# Patient Record
Sex: FEMALE | NOT HISPANIC OR LATINO | URBAN - METROPOLITAN AREA
[De-identification: names, ages, dates, MRNs, and addresses within clinical notes are randomized per-mention and may not be internally consistent; named-entity substitution may affect disease eponyms.]

---

## 2024-07-23 ENCOUNTER — TELEPHONE (OUTPATIENT)
Dept: INTERNAL MEDICINE | Age: 29
End: 2024-07-23

## 2024-07-24 ENCOUNTER — TELEPHONE (OUTPATIENT)
Dept: INTERNAL MEDICINE | Age: 29
End: 2024-07-24

## 2024-08-25 ENCOUNTER — APPOINTMENT (OUTPATIENT)
Dept: ULTRASOUND IMAGING | Age: 29
End: 2024-08-25
Attending: EMERGENCY MEDICINE
Payer: COMMERCIAL

## 2024-08-25 ENCOUNTER — HOSPITAL ENCOUNTER (EMERGENCY)
Age: 29
Discharge: HOME | End: 2024-08-25
Attending: EMERGENCY MEDICINE
Payer: COMMERCIAL

## 2024-08-25 VITALS
WEIGHT: 165 LBS | RESPIRATION RATE: 18 BRPM | DIASTOLIC BLOOD PRESSURE: 60 MMHG | BODY MASS INDEX: 28.17 KG/M2 | SYSTOLIC BLOOD PRESSURE: 95 MMHG | OXYGEN SATURATION: 100 % | HEART RATE: 72 BPM | HEIGHT: 64 IN | TEMPERATURE: 98 F

## 2024-08-25 DIAGNOSIS — R10.13 EPIGASTRIC PAIN: ICD-10-CM

## 2024-08-25 DIAGNOSIS — K80.20 CALCULUS OF GALLBLADDER WITHOUT CHOLECYSTITIS WITHOUT OBSTRUCTION: Primary | ICD-10-CM

## 2024-08-25 LAB
ALBUMIN SERPL-MCNC: 3.8 G/DL (ref 3.4–5)
ALBUMIN/GLOB SERPL: 1 {RATIO} (ref 1–2)
ALP LIVER SERPL-CCNC: 93 U/L
ALT SERPL-CCNC: 68 U/L
ANION GAP SERPL CALC-SCNC: 7 MMOL/L (ref 0–18)
AST SERPL-CCNC: 112 U/L (ref 15–37)
B-HCG UR QL: NEGATIVE
BASOPHILS # BLD AUTO: 0.02 X10(3) UL (ref 0–0.2)
BASOPHILS NFR BLD AUTO: 0.2 %
BILIRUB SERPL-MCNC: 0.4 MG/DL (ref 0.1–2)
BILIRUB UR QL STRIP.AUTO: NEGATIVE
BUN BLD-MCNC: 10 MG/DL (ref 9–23)
CALCIUM BLD-MCNC: 8.9 MG/DL (ref 8.5–10.1)
CHLORIDE SERPL-SCNC: 105 MMOL/L (ref 98–112)
CLARITY UR REFRACT.AUTO: CLEAR
CO2 SERPL-SCNC: 26 MMOL/L (ref 21–32)
COLOR UR AUTO: YELLOW
CREAT BLD-MCNC: 0.83 MG/DL
EGFRCR SERPLBLD CKD-EPI 2021: 99 ML/MIN/1.73M2 (ref 60–?)
EOSINOPHIL # BLD AUTO: 0.13 X10(3) UL (ref 0–0.7)
EOSINOPHIL NFR BLD AUTO: 1.2 %
ERYTHROCYTE [DISTWIDTH] IN BLOOD BY AUTOMATED COUNT: 13 %
GLOBULIN PLAS-MCNC: 3.7 G/DL (ref 2.8–4.4)
GLUCOSE BLD-MCNC: 100 MG/DL (ref 70–99)
GLUCOSE UR STRIP.AUTO-MCNC: NEGATIVE MG/DL
HCT VFR BLD AUTO: 41.2 %
HGB BLD-MCNC: 13.8 G/DL
IMM GRANULOCYTES # BLD AUTO: 0.04 X10(3) UL (ref 0–1)
IMM GRANULOCYTES NFR BLD: 0.4 %
KETONES UR STRIP.AUTO-MCNC: NEGATIVE MG/DL
LEUKOCYTE ESTERASE UR QL STRIP.AUTO: NEGATIVE
LIPASE SERPL-CCNC: 45 U/L (ref 12–53)
LYMPHOCYTES # BLD AUTO: 2.98 X10(3) UL (ref 1–4)
LYMPHOCYTES NFR BLD AUTO: 27.7 %
MCH RBC QN AUTO: 28.4 PG (ref 26–34)
MCHC RBC AUTO-ENTMCNC: 33.5 G/DL (ref 31–37)
MCV RBC AUTO: 84.8 FL
MONOCYTES # BLD AUTO: 0.61 X10(3) UL (ref 0.1–1)
MONOCYTES NFR BLD AUTO: 5.7 %
NEUTROPHILS # BLD AUTO: 6.99 X10 (3) UL (ref 1.5–7.7)
NEUTROPHILS # BLD AUTO: 6.99 X10(3) UL (ref 1.5–7.7)
NEUTROPHILS NFR BLD AUTO: 64.8 %
NITRITE UR QL STRIP.AUTO: NEGATIVE
OSMOLALITY SERPL CALC.SUM OF ELEC: 285 MOSM/KG (ref 275–295)
PH UR STRIP.AUTO: 6 [PH] (ref 5–8)
PLATELET # BLD AUTO: 355 10(3)UL (ref 150–450)
POTASSIUM SERPL-SCNC: 3.5 MMOL/L (ref 3.5–5.1)
PROT SERPL-MCNC: 7.5 G/DL (ref 6.4–8.2)
PROT UR STRIP.AUTO-MCNC: NEGATIVE MG/DL
RBC # BLD AUTO: 4.86 X10(6)UL
SODIUM SERPL-SCNC: 138 MMOL/L (ref 136–145)
SP GR UR STRIP.AUTO: 1.01 (ref 1–1.03)
UROBILINOGEN UR STRIP.AUTO-MCNC: 0.2 MG/DL
WBC # BLD AUTO: 10.8 X10(3) UL (ref 4–11)

## 2024-08-25 PROCEDURE — 81025 URINE PREGNANCY TEST: CPT

## 2024-08-25 PROCEDURE — 99284 EMERGENCY DEPT VISIT MOD MDM: CPT

## 2024-08-25 PROCEDURE — 96375 TX/PRO/DX INJ NEW DRUG ADDON: CPT

## 2024-08-25 PROCEDURE — 81015 MICROSCOPIC EXAM OF URINE: CPT | Performed by: EMERGENCY MEDICINE

## 2024-08-25 PROCEDURE — 80053 COMPREHEN METABOLIC PANEL: CPT | Performed by: EMERGENCY MEDICINE

## 2024-08-25 PROCEDURE — 81001 URINALYSIS AUTO W/SCOPE: CPT | Performed by: EMERGENCY MEDICINE

## 2024-08-25 PROCEDURE — 85025 COMPLETE CBC W/AUTO DIFF WBC: CPT | Performed by: EMERGENCY MEDICINE

## 2024-08-25 PROCEDURE — 76700 US EXAM ABDOM COMPLETE: CPT | Performed by: EMERGENCY MEDICINE

## 2024-08-25 PROCEDURE — 96361 HYDRATE IV INFUSION ADD-ON: CPT

## 2024-08-25 PROCEDURE — 96374 THER/PROPH/DIAG INJ IV PUSH: CPT

## 2024-08-25 PROCEDURE — 99285 EMERGENCY DEPT VISIT HI MDM: CPT

## 2024-08-25 PROCEDURE — 83690 ASSAY OF LIPASE: CPT | Performed by: EMERGENCY MEDICINE

## 2024-08-25 RX ORDER — SODIUM CHLORIDE 9 MG/ML
INJECTION, SOLUTION INTRAVENOUS CONTINUOUS
Status: DISCONTINUED | OUTPATIENT
Start: 2024-08-25 | End: 2024-08-25

## 2024-08-25 RX ORDER — KETOROLAC TROMETHAMINE 15 MG/ML
15 INJECTION, SOLUTION INTRAMUSCULAR; INTRAVENOUS ONCE
Status: COMPLETED | OUTPATIENT
Start: 2024-08-25 | End: 2024-08-25

## 2024-08-25 RX ORDER — ONDANSETRON 4 MG/1
4 TABLET, ORALLY DISINTEGRATING ORAL EVERY 4 HOURS PRN
Qty: 10 TABLET | Refills: 0 | Status: SHIPPED | OUTPATIENT
Start: 2024-08-25 | End: 2024-09-01

## 2024-08-25 RX ORDER — HYDROCODONE BITARTRATE AND ACETAMINOPHEN 10; 325 MG/1; MG/1
TABLET ORAL
Qty: 10 TABLET | Refills: 0 | Status: SHIPPED | OUTPATIENT
Start: 2024-08-25

## 2024-08-25 RX ORDER — HYDROCODONE BITARTRATE AND ACETAMINOPHEN 5; 325 MG/1; MG/1
1-2 TABLET ORAL EVERY 6 HOURS PRN
Qty: 10 TABLET | Refills: 0 | Status: SHIPPED | OUTPATIENT
Start: 2024-08-25 | End: 2024-08-25 | Stop reason: RX

## 2024-08-25 RX ORDER — ONDANSETRON 2 MG/ML
4 INJECTION INTRAMUSCULAR; INTRAVENOUS ONCE
Status: COMPLETED | OUTPATIENT
Start: 2024-08-25 | End: 2024-08-25

## 2024-08-25 RX ORDER — MAGNESIUM HYDROXIDE/ALUMINUM HYDROXICE/SIMETHICONE 120; 1200; 1200 MG/30ML; MG/30ML; MG/30ML
30 SUSPENSION ORAL ONCE
Status: COMPLETED | OUTPATIENT
Start: 2024-08-25 | End: 2024-08-25

## 2024-08-25 NOTE — ED PROVIDER NOTES
Patient Seen in: [facility] In [place]      History     Chief Complaint   Patient presents with    Abdominal Pain     Stated Complaint: abd pain xmonths    Subjective:   HPI    Patient is a 28-year-old female who states for the past months she has had several episodes of epigastric pain.  Patient states pain usually comes and goes fairly quickly.  Patient states this morning it woke her up around 9:30 AM.  Patient states pain 8 out of 10 epigastric.  Patient states it is dull ache sometimes spasming.  Patient denies nausea vomiting, no fevers or chills, no diarrhea, no hematuria dysuria.  Patient presently on her menstrual period.  Patient did have D&C 2 months ago the gynecology felt it was unrelated.  Remainder of review of systems negative.    Objective:   History reviewed. No pertinent past medical history.           History reviewed. No pertinent surgical history.             No pertinent social history.        Non-smoker, no alcohol, no drugs    Review of Systems    Positive for stated Chief Complaint: Abdominal Pain    Other systems are as noted in HPI.  Constitutional and vital signs reviewed.      All other systems reviewed and negative except as noted above.    Physical Exam     ED Triage Vitals   BP 08/25/24 1012 113/67   Pulse 08/25/24 1012 76   Resp 08/25/24 1012 14   Temp 08/25/24 1012 97.6 °F (36.4 °C)   Temp src 08/25/24 1012 Temporal   SpO2 08/25/24 1012 100 %   O2 Device 08/25/24 1151 None (Room air)       Current Vitals:   Vital Signs  BP: 95/60  Pulse: 72  Resp: 18  Temp: 97.6 °F (36.4 °C)  Temp src: Temporal    Oxygen Therapy  SpO2: 100 %  O2 Device: None (Room air)            Physical Exam  GENERAL: Patient resting on the cart in no acute distress.  HEENT: Extraocular muscles intact, pupils equal round reactive to light.  Mouth normal, neck supple, no meningismus.  LUNGS: Lungs clear to auscultation bilaterally.  CARDIOVASCULAR: + S1-S2, regular rate and rhythm, no  murmurs.  BACK: No CVA tenderness, no midline bony tenderness.  ABDOMEN: + Bowel sounds, soft, mild tenderness epigastric right upper quadrant nondistended.  No rebound, no guarding, no hepatosplenomegaly.  EXTREMITIES: Full range of motion, no tenderness, good capillary refill.  SKIN: No rash, good turgor.  NEURO: Patient answers questions appropriately.  No focal deficits appreciated.           ED Course     Labs Reviewed   COMP METABOLIC PANEL (14) - Abnormal; Notable for the following components:       Result Value    Glucose 100 (*)      (*)     ALT 68 (*)     All other components within normal limits   URINALYSIS WITH CULTURE REFLEX - Abnormal; Notable for the following components:    Blood Urine Trace-lysed (*)     All other components within normal limits   UA MICROSCOPIC ONLY, URINE - Abnormal; Notable for the following components:    RBC Urine 3-5 (*)     Bacteria Urine 1+ (*)     Squamous Epi. Cells Moderate (*)     Renal Tubular Epithelial Cells Few (*)     All other components within normal limits   LIPASE - Normal   POCT PREGNANCY URINE - Normal   CBC WITH DIFFERENTIAL WITH PLATELET             Abdominal ultrasoundThere is cholelithiasis.  There is no sonographic evidence of cholecystitis.  Common bile duct is upper normal measuring 6 mm.   Independent reviewed by myself, cholelithiasis         MDM      Patient given IV fluids.  Patient given Toradol.  Patient did feel improved.  Patient still had some discomfort was given Maalox however pain seemed to improve prior to receiving it.  Patient ultrasound did show cholelithiasis.  Patient able to tolerate p.o. liquids.  Patient felt comfortable going home.  Patient had normal white count no fever but slightly elevated liver enzymes.  Patient be referred to surgery.  Recommend return if fever, vomiting, uncontrolled pain, new or worse symptoms.  Clear liquids today, advance diet as tolerated.  Ibuprofen first Norco if needed.  Zofran as needed for  nausea.  Low-fat diet.  Also Prilosec over-the-counter as discussed.  Patient also given primary physician.  I did consider cholelithiasis, cholecystitis, gastritis, ulcer.                                   Medical Decision Making      Disposition and Plan     Clinical Impression:  1. Calculus of gallbladder without cholecystitis without obstruction    2. Epigastric pain         Disposition:  Discharge  8/25/2024 12:31 pm    Follow-up:  Kali Guevara MD  [address]  [address]  [facility] [id number] [phone]    Follow up in 2 day(s)      Marleen Odonnell MD  [address]  [facility] [address]  [phone]    Call in 2 day(s)            Medications Prescribed:  Discharge Medication List as of 8/25/2024 12:34 PM        START taking these medications    Details   ondansetron 4 MG Oral Tablet Dispersible Take 1 tablet (4 mg total) by mouth every 4 (four) hours as needed for Nausea., Normal, Disp-10 tablet, R-0      HYDROcodone-acetaminophen 5-325 MG Oral Tab Take 1-2 tablets by mouth every 6 (six) hours as needed for Pain., Normal, Disp-10 tablet, R-0

## 2024-08-25 NOTE — ED INITIAL ASSESSMENT (HPI)
Pt has had mid epigastric pain for the last month and never lasted this long at a time, lasting for the last 30 minutes pt had a D+C approximately 6-8 weeks ago. Called pt gyne  said prob no relationship. No fever or nvd

## 2024-08-25 NOTE — DISCHARGE INSTRUCTIONS
Follow-up for further evaluation with primary physician and surgery as discussed.  Clear liquids, advance diet as tolerated.  Low-fat diet going forward.  May use ibuprofen with food as discussed.  Norco as needed.  Zofran as needed for nausea.  Prilosec over-the-counter as discussed.  Return if fever greater 100.3, uncontrolled vomiting, uncontrolled pain, new or worse symptoms.

## 2024-09-24 ENCOUNTER — OFFICE VISIT (OUTPATIENT)
Facility: LOCATION | Age: 29
End: 2024-09-24
Payer: COMMERCIAL

## 2024-09-24 VITALS
RESPIRATION RATE: 14 BRPM | HEART RATE: 79 BPM | DIASTOLIC BLOOD PRESSURE: 66 MMHG | HEIGHT: 64 IN | OXYGEN SATURATION: 99 % | BODY MASS INDEX: 28 KG/M2 | TEMPERATURE: 98 F | SYSTOLIC BLOOD PRESSURE: 96 MMHG

## 2024-09-24 DIAGNOSIS — K80.20 CALCULUS OF GALLBLADDER WITHOUT CHOLECYSTITIS WITHOUT OBSTRUCTION: Primary | ICD-10-CM

## 2024-09-24 PROCEDURE — 3074F SYST BP LT 130 MM HG: CPT | Performed by: STUDENT IN AN ORGANIZED HEALTH CARE EDUCATION/TRAINING PROGRAM

## 2024-09-24 PROCEDURE — 3078F DIAST BP <80 MM HG: CPT | Performed by: STUDENT IN AN ORGANIZED HEALTH CARE EDUCATION/TRAINING PROGRAM

## 2024-09-24 PROCEDURE — 99203 OFFICE O/P NEW LOW 30 MIN: CPT | Performed by: STUDENT IN AN ORGANIZED HEALTH CARE EDUCATION/TRAINING PROGRAM

## 2024-09-24 NOTE — H&P
Patient ID: Joanna Ramires is a 29 year old female.    Chief Complaint   Patient presents with    New Patient     Establish care and evaluate Calculus of gallbladder without cholecystitis without obstruction       HPI: Joanna Ramires is a 29 year old female presents to clinic for evaluation.  Patient was recently in the emergency room at the end of August for abdominal pain.  At that time, she had had multiple episodes of epigastric abdominal pain.  She denied any other associated symptoms including nausea, vomiting, or change to her bowel habits.  Workup was positive for cholelithiasis.  Patient had improvement of her symptoms and was discharged home.  She was referred to general surgery for further evaluation and management.  Since her emergency room visit, she denies any other episodes of abdominal pain.  Prior to her episode of abdominal pain, patient denies ever having any abdominal bloating, nausea, or vomiting.  She denies any worsening of symptoms with spicy or greasy foods.    Workup:   Labs 8/25/2024  WBC 10.8  AST//68  Tbili 0.4  AlkPhos 93    US ABDOMEN COMPLETE (CPT=76700)    Result Date: 8/25/2024  CONCLUSION:  There is cholelithiasis.  There is no sonographic evidence of cholecystitis.  Common bile duct is upper normal measuring 6 mm.   LOCATION:  [facility]    Dictated by (CST): Richi Oro MD on 8/25/2024 at 11:27 AM     Finalized by (CST): Richi Oro MD on 8/25/2024 at 11:27 AM          Past Medical History  History reviewed. No pertinent past medical history.    Past Surgical History  History reviewed. No pertinent surgical history.    Medications  Current Outpatient Medications   Medication Sig Dispense Refill    omeprazole 20 MG Oral Capsule Delayed Release Take 1 capsule (20 mg total) by mouth every morning before breakfast.      HYDROcodone-acetaminophen  MG Oral Tab 0.5  or 1 pill every 6 hours as needed. (Patient not taking: Reported on 9/24/2024) 10 tablet 0       Allergies  No  Known Allergies    Social History  History   Smoking Status    Never   Smokeless Tobacco    Never     History   Alcohol Use Never     History   Drug Use Unknown       Family History  History reviewed. No pertinent family history.    Review of Systems  Review of Systems   Constitutional: Negative.    Respiratory: Negative.     Cardiovascular: Negative.    Gastrointestinal:  Positive for abdominal pain. Negative for abdominal distention, constipation, diarrhea, nausea and vomiting.       Exam  Vitals:    09/24/24 1005   BP: 96/66   Pulse: 79   Resp: 14   Temp: 98 °F (36.7 °C)     Physical Exam  Constitutional:       Appearance: Normal appearance.   Cardiovascular:      Rate and Rhythm: Normal rate and regular rhythm.   Pulmonary:      Effort: Pulmonary effort is normal.      Breath sounds: Normal breath sounds.   Abdominal:      General: Abdomen is flat. There is no distension.      Palpations: Abdomen is soft.      Tenderness: There is no abdominal tenderness.   Skin:     General: Skin is warm and dry.   Neurological:      Mental Status: She is alert and oriented to person, place, and time.           Assessment/Plan  Assessment   Problem List Items Addressed This Visit    None  Visit Diagnoses       Calculus of gallbladder without cholecystitis without obstruction    -  Primary    Relevant Medications    omeprazole 20 MG Oral Capsule Delayed Release            Joanna Ramires is a 29 year old female with cholelithiasis    Ultrasound images were reviewed personally by me and with the patient  Ultrasound shows approximately half centimeter gallstone without any evidence of cholecystitis  Patient reports a history mainly of epigastric pain but no other symptoms  She denies any postprandial pain, abdominal bloating, nausea, or vomiting  Despite not having many symptoms, I do believe patient is having symptoms from her gallbladder  However, we can do a period of observation  I will have patient follow-up in 1 month  During  this time, she is to take note of what makes the pain worse and what makes the pain better and if there is any association with food  Will also give prescription of omeprazole to see if this helps with her symptoms  If her symptoms are more heartburn related, can continue omeprazole and monitoring  If symptoms return and is uncontrolled with omeprazole, patient will most likely need to proceed with cholecystectomy    She is to call my office for any questions or concerns, especially any changes in her condition    Marleen Odonnell MD  General Surgery  [facility]     CC:  No primary care provider on file.

## 2024-11-04 ENCOUNTER — OFFICE VISIT (OUTPATIENT)
Facility: LOCATION | Age: 29
End: 2024-11-04
Payer: COMMERCIAL

## 2024-11-04 VITALS
WEIGHT: 160 LBS | DIASTOLIC BLOOD PRESSURE: 71 MMHG | BODY MASS INDEX: 28.35 KG/M2 | HEIGHT: 63 IN | OXYGEN SATURATION: 96 % | SYSTOLIC BLOOD PRESSURE: 115 MMHG | HEART RATE: 84 BPM

## 2024-11-04 DIAGNOSIS — K80.20 CALCULUS OF GALLBLADDER WITHOUT CHOLECYSTITIS WITHOUT OBSTRUCTION: Primary | ICD-10-CM

## 2024-11-04 NOTE — PROGRESS NOTES
Patient ID: Joanna Ramires is a 29 year old female.    Chief Complaint   Patient presents with    Follow - Up     F/U -Calculus of gallbladder without cholecystitis without obstruction, no symptoms.       HPI: Joanna Ramires is a 29 year old female presents to clinic for follow-up.  Since last clinic appointment, patient denies any symptoms.  She denies any abdominal pain, nausea, vomiting, abdominal bloating, or change in her bowel habits.  She has been tolerating a regular diet.  She denies any other symptoms.    Workup: None      Past Medical History  History reviewed. No pertinent past medical history.    Past Surgical History  Past Surgical History:   Procedure Laterality Date    D & c  06/21/2024       Medications  Current Outpatient Medications   Medication Sig Dispense Refill    omeprazole 20 MG Oral Capsule Delayed Release Take 1 capsule (20 mg total) by mouth every morning before breakfast. (Patient not taking: Reported on 11/4/2024)      HYDROcodone-acetaminophen  MG Oral Tab 0.5  or 1 pill every 6 hours as needed. (Patient not taking: Reported on 11/4/2024) 10 tablet 0       Allergies  Allergies[1]    Social History  History   Smoking Status    Never   Smokeless Tobacco    Never     History   Alcohol Use Not Currently     History   Drug Use Unknown       Family History  History reviewed. No pertinent family history.    Review of Systems  Review of Systems   Constitutional: Negative.    Respiratory: Negative.     Cardiovascular: Negative.    Gastrointestinal:  Negative for abdominal distention, abdominal pain, constipation, nausea and vomiting.       Exam  Vitals:    11/04/24 0931   BP: 115/71   Pulse: 84     Physical Exam  Constitutional:       Appearance: Normal appearance.   Cardiovascular:      Rate and Rhythm: Normal rate.   Pulmonary:      Effort: Pulmonary effort is normal.   Abdominal:      General: Abdomen is flat. There is no distension.      Palpations: Abdomen is soft.      Tenderness: There  is no abdominal tenderness.   Skin:     General: Skin is warm and dry.   Neurological:      Mental Status: She is alert and oriented to person, place, and time.           Assessment/Plan  Assessment   Problem List Items Addressed This Visit          Gastrointestinal and Abdominal    Calculus of gallbladder without cholecystitis without obstruction - Primary       Joanna Ike is a 29 year old female with cholelithiasis    Patient had 1 episode of symptomatic cholelithiasis  Since then, she has been able to tolerate a regular diet without any recurrent symptoms  Ultrasound images were reviewed again by me today and just shows minimal gallstone burden  I recommend continuing observation  If patient has recurrent symptoms including abdominal pain, nausea, bloating, or vomiting, she can contact the office to be reevaluated  If she has severe pain that is unrelenting, she should go to the emergency room as she may have an acutely inflamed gallbladder and needs surgery  Patient acknowledged understanding and agrees to the plan    She can call the office for any questions or concerns    Thank you for letting me participate in the care of this patient      Marleen Odonnell MD  General Surgery  [facility]     CC:  No primary care provider on file.         [1] No Known Allergies